# Patient Record
Sex: FEMALE | Race: BLACK OR AFRICAN AMERICAN | NOT HISPANIC OR LATINO | URBAN - METROPOLITAN AREA
[De-identification: names, ages, dates, MRNs, and addresses within clinical notes are randomized per-mention and may not be internally consistent; named-entity substitution may affect disease eponyms.]

---

## 2020-06-10 ENCOUNTER — OFFICE VISIT (OUTPATIENT)
Dept: URBAN - METROPOLITAN AREA CLINIC 92 | Facility: CLINIC | Age: 75
End: 2020-06-10
Payer: MEDICARE

## 2020-06-10 DIAGNOSIS — R10.9 ABDOMINAL PAIN: ICD-10-CM

## 2020-06-10 DIAGNOSIS — R19.7 DIARRHEA: ICD-10-CM

## 2020-06-10 DIAGNOSIS — K20.9 ESOPHAGITIS: ICD-10-CM

## 2020-06-10 DIAGNOSIS — R10.84 ABDOMINAL CRAMPING, GENERALIZED: ICD-10-CM

## 2020-06-10 DIAGNOSIS — K92.1 MELENA: ICD-10-CM

## 2020-06-10 DIAGNOSIS — R63.4 WEIGHT LOSS: ICD-10-CM

## 2020-06-10 DIAGNOSIS — K29.70 GASTRITIS: ICD-10-CM

## 2020-06-10 PROCEDURE — G9903 PT SCRN TBCO ID AS NON USER: HCPCS | Performed by: INTERNAL MEDICINE

## 2020-06-10 PROCEDURE — 99214 OFFICE O/P EST MOD 30 MIN: CPT | Performed by: INTERNAL MEDICINE

## 2020-06-10 PROCEDURE — 1036F TOBACCO NON-USER: CPT | Performed by: INTERNAL MEDICINE

## 2020-06-10 RX ORDER — PINDOLOL 5 MG/1
TABLET ORAL
Qty: 0 | Refills: 0 | Status: ACTIVE | COMMUNITY
Start: 1900-01-01 | End: 1900-01-01

## 2020-06-10 RX ORDER — LOVASTATIN 40 MG/1
TABLET ORAL
Qty: 0 | Refills: 0 | Status: ACTIVE | COMMUNITY
Start: 1900-01-01 | End: 1900-01-01

## 2020-06-10 RX ORDER — LOSARTAN POTASSIUM 100 MG/1
TABLET, FILM COATED ORAL
Qty: 0 | Refills: 0 | Status: ACTIVE | COMMUNITY
Start: 1900-01-01 | End: 1900-01-01

## 2020-06-10 NOTE — HPI-OTHER HISTORIES
wt incr 2# over 2y  (was 113 in 2018)  Capsule endoscopy: unable to review result in our system today  h/o esophagitis/gastritis  normal colon in past 2 yrs  notes mild abd pain  mild diarrhea  no melena  recent CT apparently showed GB cyst, told needs CCY

## 2020-06-17 ENCOUNTER — TELEPHONE ENCOUNTER (OUTPATIENT)
Dept: URBAN - METROPOLITAN AREA CLINIC 92 | Facility: CLINIC | Age: 75
End: 2020-06-17

## 2020-06-19 ENCOUNTER — OFFICE VISIT (OUTPATIENT)
Dept: URBAN - METROPOLITAN AREA TELEHEALTH 2 | Facility: TELEHEALTH | Age: 75
End: 2020-06-19
Payer: MEDICARE

## 2020-06-19 DIAGNOSIS — K82.9 GALLBLADDER DISEASE: ICD-10-CM

## 2020-06-19 DIAGNOSIS — R63.4 WEIGHT LOSS: ICD-10-CM

## 2020-06-19 DIAGNOSIS — K76.89 LIVER CYST: ICD-10-CM

## 2020-06-19 DIAGNOSIS — R19.7 DIARRHEA: ICD-10-CM

## 2020-06-19 DIAGNOSIS — K80.20 GALL STONE: ICD-10-CM

## 2020-06-19 DIAGNOSIS — R10.9 ABDOMINAL PAIN: ICD-10-CM

## 2020-06-19 DIAGNOSIS — R10.84 ABDOMINAL CRAMPING, GENERALIZED: ICD-10-CM

## 2020-06-19 DIAGNOSIS — K92.1 MELENA: ICD-10-CM

## 2020-06-19 DIAGNOSIS — R93.89 ABNORMAL CAT SCAN: ICD-10-CM

## 2020-06-19 PROCEDURE — 99214 OFFICE O/P EST MOD 30 MIN: CPT | Performed by: INTERNAL MEDICINE

## 2020-06-19 PROCEDURE — 1036F TOBACCO NON-USER: CPT | Performed by: INTERNAL MEDICINE

## 2020-06-19 PROCEDURE — G9903 PT SCRN TBCO ID AS NON USER: HCPCS | Performed by: INTERNAL MEDICINE

## 2020-06-19 RX ORDER — LOSARTAN POTASSIUM 100 MG/1
TABLET, FILM COATED ORAL
Qty: 0 | Refills: 0 | Status: ACTIVE | COMMUNITY
Start: 1900-01-01 | End: 1900-01-01

## 2020-06-19 RX ORDER — PINDOLOL 5 MG/1
TABLET ORAL
Qty: 0 | Refills: 0 | Status: ACTIVE | COMMUNITY
Start: 1900-01-01 | End: 1900-01-01

## 2020-06-19 RX ORDER — LOVASTATIN 40 MG/1
TABLET ORAL
Qty: 0 | Refills: 0 | Status: ACTIVE | COMMUNITY
Start: 1900-01-01 | End: 1900-01-01

## 2020-06-19 NOTE — HPI-OTHER HISTORIES
Wt 5# over 10d  scheduled for CCY w Dr. Seth 6/25   CT 5/22/20 cholelithiasis 1.6cm peripheral calcified density superior to gallbladder rule out calcified vascular structure cyst  Labs and US ordered last visit not yet done  no further GIB or altered bowel habits - now regular  3days ago Abd pain BLQ sharp and stabbing self-resolved no obvious exac/rel factors   Denies N/V diarrhea constipation hematochezia melena jaundice unintentional wt loss   Denies dyspepsia htbrn dysphagia odynophagia food impaction CP cough anorexia light headedness   Denies scleral icterus, increased abd girth, LE edema, confusion, disorientation, memory loss, hematemesis

## 2021-03-10 ENCOUNTER — TELEPHONE ENCOUNTER (OUTPATIENT)
Dept: URBAN - METROPOLITAN AREA CLINIC 92 | Facility: CLINIC | Age: 76
End: 2021-03-10

## 2021-03-11 ENCOUNTER — OFFICE VISIT (OUTPATIENT)
Dept: URBAN - METROPOLITAN AREA CLINIC 92 | Facility: CLINIC | Age: 76
End: 2021-03-11

## 2021-03-11 RX ORDER — PINDOLOL 5 MG/1
TABLET ORAL
Qty: 0 | Refills: 0 | COMMUNITY
Start: 1900-01-01

## 2021-03-11 RX ORDER — LOVASTATIN 40 MG/1
TABLET ORAL
Qty: 0 | Refills: 0 | COMMUNITY
Start: 1900-01-01

## 2021-03-11 RX ORDER — LOSARTAN POTASSIUM 100 MG/1
TABLET, FILM COATED ORAL
Qty: 0 | Refills: 0 | COMMUNITY
Start: 1900-01-01

## 2021-03-11 NOTE — HPI-OTHER HISTORIES
Wt __# over 9mo (was 110)  CCY w Dr. Seth 6/25/20   CT 5/22/20 cholelithiasis 1.6cm peripheral calcified density superior to gallbladder rule out calcified vascular structure cyst  Labs and US ordered last visit not yet done  no further GIB or altered bowel habits - now regular  Abd pain BLQ sharp and stabbing self-resolved no obvious exac/rel factors   Denies N/V diarrhea constipation hematochezia melena jaundice unintentional wt loss   Denies dyspepsia htbrn dysphagia odynophagia food impaction CP cough anorexia light headedness   Denies scleral icterus, increased abd girth, LE edema, confusion, disorientation, memory loss, hematemesis

## 2021-03-26 ENCOUNTER — OFFICE VISIT (OUTPATIENT)
Dept: URBAN - METROPOLITAN AREA TELEHEALTH 2 | Facility: TELEHEALTH | Age: 76
End: 2021-03-26

## 2021-03-29 ENCOUNTER — OFFICE VISIT (OUTPATIENT)
Dept: URBAN - METROPOLITAN AREA CLINIC 92 | Facility: CLINIC | Age: 76
End: 2021-03-29
Payer: MEDICARE

## 2021-03-29 VITALS
TEMPERATURE: 96.6 F | HEART RATE: 58 BPM | SYSTOLIC BLOOD PRESSURE: 169 MMHG | WEIGHT: 111 LBS | DIASTOLIC BLOOD PRESSURE: 99 MMHG | HEIGHT: 62 IN | BODY MASS INDEX: 20.43 KG/M2

## 2021-03-29 DIAGNOSIS — R10.84 ABDOMINAL CRAMPING, GENERALIZED: ICD-10-CM

## 2021-03-29 DIAGNOSIS — R19.7 DIARRHEA: ICD-10-CM

## 2021-03-29 DIAGNOSIS — K80.20 GALL STONE: ICD-10-CM

## 2021-03-29 DIAGNOSIS — K92.1 MELENA: ICD-10-CM

## 2021-03-29 PROCEDURE — 99214 OFFICE O/P EST MOD 30 MIN: CPT | Performed by: INTERNAL MEDICINE

## 2021-03-29 RX ORDER — PINDOLOL 5 MG/1
TABLET ORAL
Qty: 0 | Refills: 0 | Status: ACTIVE | COMMUNITY
Start: 1900-01-01

## 2021-03-29 RX ORDER — LOSARTAN POTASSIUM 100 MG/1
TABLET, FILM COATED ORAL
Qty: 0 | Refills: 0 | Status: ACTIVE | COMMUNITY
Start: 1900-01-01

## 2021-03-29 RX ORDER — LOVASTATIN 40 MG/1
TABLET ORAL
Qty: 0 | Refills: 0 | Status: ACTIVE | COMMUNITY
Start: 1900-01-01

## 2021-03-29 NOTE — HPI-OTHER HISTORIES
Wt incr 1# over 9mo (was 110)  CCY w Dr. Seth 6/25/20   CT 5/22/20 cholelithiasis 1.6cm peripheral calcified density superior to gallbladder rule out calcified vascular structure cyst  EGD/colon 9/12/18 reflux esophagitis, hpylori negative gastritis normal colonoscopy to cecum  no further GIB or altered bowel habits - now regular  Abd pain BLQ sharp and stabbing severe self-resolves no obvious exac/rel factors   Denies N/V diarrhea constipation hematochezia melena jaundice unintentional wt loss   Denies dyspepsia htbrn dysphagia odynophagia food impaction CP cough anorexia light headedness   Denies scleral icterus, increased abd girth, LE edema, confusion, disorientation, memory loss, hematemesis

## 2021-03-29 NOTE — EXAM-PHYSICAL EXAM
HEENT: NC/AT, pupils nondilated, trachea midline Neck: supple, NT, no LAD Chest: Symmetrical, no labored respirations, no audible wheezing Heart: Regular rate and rhythm Abd: Soft NT/ND, no organomegaly, distention, masses Ext: no c/c/e Neuro: no focal defects, alert and oriented

## 2021-04-14 ENCOUNTER — TELEPHONE ENCOUNTER (OUTPATIENT)
Dept: URBAN - METROPOLITAN AREA CLINIC 92 | Facility: CLINIC | Age: 76
End: 2021-04-14

## 2021-05-06 ENCOUNTER — TELEPHONE ENCOUNTER (OUTPATIENT)
Dept: URBAN - METROPOLITAN AREA CLINIC 92 | Facility: CLINIC | Age: 76
End: 2021-05-06

## 2021-05-07 ENCOUNTER — OFFICE VISIT (OUTPATIENT)
Dept: URBAN - METROPOLITAN AREA TELEHEALTH 2 | Facility: TELEHEALTH | Age: 76
End: 2021-05-07

## 2021-05-07 RX ORDER — PINDOLOL 5 MG/1
TABLET ORAL
Qty: 0 | Refills: 0 | COMMUNITY
Start: 1900-01-01

## 2021-05-07 RX ORDER — LOSARTAN POTASSIUM 100 MG/1
TABLET, FILM COATED ORAL
Qty: 0 | Refills: 0 | COMMUNITY
Start: 1900-01-01

## 2021-05-07 RX ORDER — LOVASTATIN 40 MG/1
TABLET ORAL
Qty: 0 | Refills: 0 | COMMUNITY
Start: 1900-01-01

## 2021-05-07 NOTE — HPI-OTHER HISTORIES
Wt __# over 5wks (was 111)  CCY w Dr. Seth 6/25/20   CT 5/22/20 cholelithiasis 1.6cm peripheral calcified density superior to gallbladder rule out calcified vascular structure cyst  Repeat CT scan _____  Labs 1/18/21 WBC 4 HCT 39 Plt 235 K 4.4 Cr 0.6 ALT 10 AST 21  TB 0.5  EGD/colon 9/12/18 reflux esophagitis, hpylori negative gastritis normal colonoscopy to cecum  no further GIB or altered bowel habits - now regular  Abd pain BLQ sharp and stabbing severe self-resolves no obvious exac/rel factors   Denies N/V diarrhea constipation hematochezia melena jaundice unintentional wt loss   Denies dyspepsia htbrn dysphagia odynophagia food impaction CP cough anorexia light headedness   Denies scleral icterus, increased abd girth, LE edema, confusion, disorientation, memory loss, hematemesis

## 2021-05-21 ENCOUNTER — TELEPHONE ENCOUNTER (OUTPATIENT)
Dept: URBAN - METROPOLITAN AREA CLINIC 92 | Facility: CLINIC | Age: 76
End: 2021-05-21

## 2021-05-24 ENCOUNTER — OFFICE VISIT (OUTPATIENT)
Dept: URBAN - METROPOLITAN AREA CLINIC 92 | Facility: CLINIC | Age: 76
End: 2021-05-24
Payer: MEDICARE

## 2021-05-24 DIAGNOSIS — R93.89 ABNORMAL CAT SCAN: ICD-10-CM

## 2021-05-24 DIAGNOSIS — R63.4 WEIGHT LOSS: ICD-10-CM

## 2021-05-24 DIAGNOSIS — K82.9 GALLBLADDER DISEASE: ICD-10-CM

## 2021-05-24 DIAGNOSIS — K80.20 GALL STONE: ICD-10-CM

## 2021-05-24 DIAGNOSIS — K76.89 LIVER CYST: ICD-10-CM

## 2021-05-24 DIAGNOSIS — K92.1 MELENA: ICD-10-CM

## 2021-05-24 DIAGNOSIS — R10.84 ABDOMINAL PAIN, GENERALIZED: ICD-10-CM

## 2021-05-24 DIAGNOSIS — R19.7 DIARRHEA: ICD-10-CM

## 2021-05-24 PROBLEM — 129679001 COMPUTED TOMOGRAPHY RESULT ABNORMAL: Status: ACTIVE | Noted: 2021-03-10

## 2021-05-24 PROBLEM — 70342003 CHOLELITHIASIS WITHOUT OBSTRUCTION: Status: ACTIVE | Noted: 2021-03-10

## 2021-05-24 PROBLEM — 85057007 LIVER CYST: Status: ACTIVE | Noted: 2021-03-10

## 2021-05-24 PROCEDURE — 99214 OFFICE O/P EST MOD 30 MIN: CPT | Performed by: INTERNAL MEDICINE

## 2021-05-24 RX ORDER — LOSARTAN POTASSIUM 100 MG/1
TABLET, FILM COATED ORAL
Qty: 0 | Refills: 0 | Status: ON HOLD | COMMUNITY
Start: 1900-01-01

## 2021-05-24 RX ORDER — PINDOLOL 5 MG/1
TABLET ORAL
Qty: 0 | Refills: 0 | Status: ON HOLD | COMMUNITY
Start: 1900-01-01

## 2021-05-24 RX ORDER — LOVASTATIN 40 MG/1
TABLET ORAL
Qty: 0 | Refills: 0 | Status: ON HOLD | COMMUNITY
Start: 1900-01-01

## 2021-05-24 NOTE — HPI-OTHER HISTORIES
Wt decr 3# over 8wks (was 111)  CCY w Dr. Seth 6/25/20   CT 5/22/20 cholelithiasis 1.6cm peripheral calcified density superior to gallbladder rule out calcified vascular structure cyst  Repeat CT scan 5/14/21 WNL  EGD/colon 9/12/18 reflux esophagitis, hpylori negative gastritis normal colonoscopy to cecum  no further GIB or altered bowel habits - now regular  Abd pain BLQ sharp and stabbing severe self-resolves no obvious exac/rel factors   Denies N/V diarrhea constipation hematochezia melena jaundice unintentional wt loss   Denies dyspepsia htbrn dysphagia odynophagia food impaction CP cough anorexia light headedness   Denies scleral icterus, increased abd girth, LE edema, confusion, disorientation, memory loss, hematemesis

## 2022-03-13 ENCOUNTER — OUT OF OFFICE VISIT (OUTPATIENT)
Dept: URBAN - METROPOLITAN AREA MEDICAL CENTER 12 | Facility: MEDICAL CENTER | Age: 77
End: 2022-03-13
Payer: MEDICARE

## 2022-03-13 DIAGNOSIS — K62.5 ANAL BLEEDING: ICD-10-CM

## 2022-03-13 DIAGNOSIS — R93.3 ABN FINDINGS-GI TRACT: ICD-10-CM

## 2022-03-13 DIAGNOSIS — K21.9 ACID REFLUX: ICD-10-CM

## 2022-03-13 PROCEDURE — G8427 DOCREV CUR MEDS BY ELIG CLIN: HCPCS | Performed by: PHYSICIAN ASSISTANT

## 2022-03-13 PROCEDURE — 99222 1ST HOSP IP/OBS MODERATE 55: CPT | Performed by: PHYSICIAN ASSISTANT

## 2022-03-14 ENCOUNTER — OUT OF OFFICE VISIT (OUTPATIENT)
Dept: URBAN - METROPOLITAN AREA MEDICAL CENTER 12 | Facility: MEDICAL CENTER | Age: 77
End: 2022-03-14
Payer: MEDICARE

## 2022-03-14 DIAGNOSIS — D12.2 ADENOMA OF ASCENDING COLON: ICD-10-CM

## 2022-03-14 DIAGNOSIS — K63.89 OTHER SPECIFIED DISEASES OF INTESTINE: ICD-10-CM

## 2022-03-14 DIAGNOSIS — K92.1 ACUTE MELENA: ICD-10-CM

## 2022-03-14 PROCEDURE — 45385 COLONOSCOPY W/LESION REMOVAL: CPT | Performed by: INTERNAL MEDICINE

## 2022-03-14 PROCEDURE — 45380 COLONOSCOPY AND BIOPSY: CPT | Performed by: INTERNAL MEDICINE

## 2022-04-25 ENCOUNTER — DASHBOARD ENCOUNTERS (OUTPATIENT)
Age: 77
End: 2022-04-25

## 2022-04-25 ENCOUNTER — OFFICE VISIT (OUTPATIENT)
Dept: URBAN - METROPOLITAN AREA CLINIC 92 | Facility: CLINIC | Age: 77
End: 2022-04-25
Payer: MEDICARE

## 2022-04-25 VITALS
SYSTOLIC BLOOD PRESSURE: 150 MMHG | TEMPERATURE: 96.8 F | HEART RATE: 57 BPM | BODY MASS INDEX: 22.82 KG/M2 | WEIGHT: 124 LBS | HEIGHT: 62 IN | DIASTOLIC BLOOD PRESSURE: 66 MMHG

## 2022-04-25 DIAGNOSIS — K55.9 ISCHEMIC COLITIS: ICD-10-CM

## 2022-04-25 PROBLEM — 30588004: Status: ACTIVE | Noted: 2022-04-25

## 2022-04-25 PROCEDURE — 99213 OFFICE O/P EST LOW 20 MIN: CPT | Performed by: INTERNAL MEDICINE

## 2022-04-25 RX ORDER — LOSARTAN POTASSIUM 100 MG/1
TABLET, FILM COATED ORAL
Qty: 0 | Refills: 0 | Status: ON HOLD | COMMUNITY
Start: 1900-01-01

## 2022-04-25 RX ORDER — LOVASTATIN 40 MG/1
TABLET ORAL
Qty: 0 | Refills: 0 | Status: ON HOLD | COMMUNITY
Start: 1900-01-01

## 2022-04-25 RX ORDER — PINDOLOL 5 MG/1
TABLET ORAL
Qty: 0 | Refills: 0 | Status: ON HOLD | COMMUNITY
Start: 1900-01-01

## 2022-04-25 NOTE — HPI-TODAY'S VISIT:
Pt seen in March 2022 for hematochezia and abd pain at [facility]. Ct and colonoscopy with descending mild colitis. Bx consistent with likely ischemic colitis. Now asymptomatic. Small adenoma also removed. Denies any current symptoms.